# Patient Record
Sex: MALE | Race: WHITE | NOT HISPANIC OR LATINO | Employment: FULL TIME | URBAN - METROPOLITAN AREA
[De-identification: names, ages, dates, MRNs, and addresses within clinical notes are randomized per-mention and may not be internally consistent; named-entity substitution may affect disease eponyms.]

---

## 2020-03-18 ENCOUNTER — APPOINTMENT (OUTPATIENT)
Dept: GENERAL RADIOLOGY | Facility: HOSPITAL | Age: 25
End: 2020-03-18

## 2020-03-18 ENCOUNTER — HOSPITAL ENCOUNTER (EMERGENCY)
Facility: HOSPITAL | Age: 25
Discharge: HOME | End: 2020-03-18
Attending: EMERGENCY MEDICINE | Admitting: EMERGENCY MEDICINE

## 2020-03-18 VITALS
TEMPERATURE: 98.1 F | RESPIRATION RATE: 18 BRPM | HEART RATE: 91 BPM | OXYGEN SATURATION: 98 % | DIASTOLIC BLOOD PRESSURE: 71 MMHG | SYSTOLIC BLOOD PRESSURE: 121 MMHG | BODY MASS INDEX: 23.8 KG/M2 | HEIGHT: 71 IN | WEIGHT: 170 LBS

## 2020-03-18 DIAGNOSIS — S30.0XXA CONTUSION OF COCCYX, INITIAL ENCOUNTER: Primary | ICD-10-CM

## 2020-03-18 PROCEDURE — 99282 EMERGENCY DEPT VISIT SF MDM: CPT

## 2020-03-18 PROCEDURE — 72220 X-RAY EXAM SACRUM TAILBONE: CPT

## 2020-03-18 RX ORDER — DICLOFENAC SODIUM 75 MG/1
75 TABLET, DELAYED RELEASE ORAL 2 TIMES DAILY
Qty: 14 TABLET | Refills: 0 | Status: SHIPPED | OUTPATIENT
Start: 2020-03-18

## 2020-03-18 NOTE — ED PROVIDER NOTES
Subjective   Juancarlos Contreras is a 24 y.o.male who presents to the ED status post fall. The patient experienced a fall earlier today. During this time, he was pulling on something at work when it suddenly came off the wall, causing him to fall backwards. He did not hit his head or lose consciousness. He did hit his tailbone on the corner of a truck, which has been causing him pain. He has not taken any medication for his pain. There are no other complaints at this time.       History provided by:  Patient  Fall   Mechanism of injury: fall    Injury location:  Pelvis  Pelvic injury location: tailbone.  Incident location:  Work  Time since incident: earlier today.  Arrived directly from scene: yes    Fall:     Fall occurred: pulling on an object.    Impact surface: truck.    Point of impact: tailbone.    Entrapped after fall: no    Suspicion of alcohol use: no    Suspicion of drug use: no    Prior to arrival data:     Patient ambulatory at scene: yes      Blood loss:  None    Responsiveness at scene:  Alert    Orientation at scene:  Person, place, situation and time    Loss of consciousness: no      Amnesic to event: no    Associated symptoms: no headaches and no loss of consciousness        Review of Systems   Musculoskeletal: Positive for arthralgias (tailbone pain).   Neurological: Negative for loss of consciousness and headaches.   All other systems reviewed and are negative.      No past medical history on file.    No Known Allergies    No past surgical history on file.    No family history on file.    Social History     Socioeconomic History   • Marital status: Single     Spouse name: Not on file   • Number of children: Not on file   • Years of education: Not on file   • Highest education level: Not on file         Objective   Physical Exam   Constitutional: He is oriented to person, place, and time. He appears well-developed and well-nourished. No distress.   HENT:   Head: Normocephalic and atraumatic.   Nose:  Nose normal.   Eyes: Conjunctivae are normal. No scleral icterus.   Neck: Normal range of motion. Neck supple.   Pulmonary/Chest: Effort normal. No respiratory distress.   Musculoskeletal: Normal range of motion. He exhibits tenderness. He exhibits no edema.   Tenderness over the tailbone.    Neurological: He is alert and oriented to person, place, and time.   Skin: Skin is warm and dry.   Bruising to the medial left buttock.    Psychiatric: He has a normal mood and affect. His behavior is normal.   Nursing note and vitals reviewed.      Procedures         ED Course          XR negative.  Pt counseled.                                  MDM    Final diagnoses:   Contusion of coccyx, initial encounter       Documentation assistance provided by karlene Hays.  Information recorded by the karlene was done at my direction and has been verified and validated by me.     Duane Hays  03/18/20 1816       Duane Hays  03/18/20 1819       Douglas Guardado MD  03/18/20 [id number]